# Patient Record
Sex: MALE | Employment: STUDENT | URBAN - METROPOLITAN AREA
[De-identification: names, ages, dates, MRNs, and addresses within clinical notes are randomized per-mention and may not be internally consistent; named-entity substitution may affect disease eponyms.]

---

## 2017-05-08 ENCOUNTER — TELEPHONE (OUTPATIENT)
Dept: FAMILY MEDICINE CLINIC | Facility: CLINIC | Age: 22
End: 2017-05-08

## 2017-05-08 NOTE — TELEPHONE ENCOUNTER
Mom called to schedule appointment with Dr David Fields for her son's knee pain and immunizations. I explained that Dr David Fields is out of the office until 5/15 and I would need to transfer her to the triage nurse.   Mom raised her voice that she didn't understand

## 2017-05-08 NOTE — TELEPHONE ENCOUNTER
Future Appointments  Date Time Provider Holly Giang   5/18/2017 10:30 AM Srikanth Bourgeois MD [facility] [address]       Spoke with mother and advised of situation  Son came home from college with knee pain and has had for awhile.  Will wait to see Dr Erin Zepeda

## 2017-05-18 ENCOUNTER — OFFICE VISIT (OUTPATIENT)
Dept: FAMILY MEDICINE CLINIC | Facility: CLINIC | Age: 22
End: 2017-05-18

## 2017-05-18 VITALS
OXYGEN SATURATION: 98 % | HEART RATE: 83 BPM | RESPIRATION RATE: 18 BRPM | TEMPERATURE: 99 F | BODY MASS INDEX: 26.65 KG/M2 | DIASTOLIC BLOOD PRESSURE: 64 MMHG | WEIGHT: 167.81 LBS | HEIGHT: 66.5 IN | SYSTOLIC BLOOD PRESSURE: 104 MMHG

## 2017-05-18 DIAGNOSIS — M25.561 CHRONIC PAIN OF RIGHT KNEE: Primary | ICD-10-CM

## 2017-05-18 DIAGNOSIS — G89.29 CHRONIC PAIN OF RIGHT KNEE: Primary | ICD-10-CM

## 2017-05-18 PROCEDURE — 99213 OFFICE O/P EST LOW 20 MIN: CPT | Performed by: FAMILY MEDICINE

## 2017-05-18 NOTE — PATIENT INSTRUCTIONS
Recommend low impact activities (walking, elliptical, bicycle, swimming), avoid jumping (basketball, tennis, running) when painful. If you try high impact activities in 2-3 weeks and have pain, call and we can refer to physical therapy or orthopedics.

## 2017-05-18 NOTE — PROGRESS NOTES
Louis Setting IS A 22year old male HERE FOR Patient presents with:  Knee Pain: right knee pain would like a referral for PT  Complete Form: complete immunization history form for school  -may need some imm/inj will be traveling to [facility] today at 5pm Maternal Grandfather      CVA   • Hypertension Maternal Grandmother    • Blood Disorder Other      blood clots, fam hx       Social history:         Social History   Marital Status: Single  Spouse Name: N/A    Years of Education: N/A  Number of Children: 0 If you try high impact activities in 2-3 weeks and have pain, call and we can refer to physical therapy or orthopedics. Recommend HPV vaccine, info given to read/review.

## 2017-11-19 ENCOUNTER — OFFICE VISIT (OUTPATIENT)
Dept: SLEEP CENTER | Facility: HOSPITAL | Age: 22
End: 2017-11-19
Attending: INTERNAL MEDICINE
Payer: COMMERCIAL

## 2017-11-19 PROCEDURE — 95810 POLYSOM 6/> YRS 4/> PARAM: CPT

## 2017-11-20 ENCOUNTER — OFFICE VISIT (OUTPATIENT)
Dept: SLEEP CENTER | Facility: HOSPITAL | Age: 22
End: 2017-11-20
Attending: INTERNAL MEDICINE
Payer: COMMERCIAL

## 2017-11-20 ENCOUNTER — LAB ENCOUNTER (OUTPATIENT)
Dept: LAB | Age: 22
End: 2017-11-20
Attending: INTERNAL MEDICINE
Payer: COMMERCIAL

## 2017-11-20 DIAGNOSIS — G47.00 INSOMNIA: Primary | ICD-10-CM

## 2017-11-20 PROCEDURE — 80307 DRUG TEST PRSMV CHEM ANLYZR: CPT

## 2017-11-20 PROCEDURE — 95805 MULTIPLE SLEEP LATENCY TEST: CPT

## 2017-11-23 NOTE — PROCEDURES
[address]       Accredited by the [facility] ([facility])    PATIENT'S NAME:        Ángel Likes PHYSICIAN:   Jeanette Stevens M.D. REFERRING PHYSICIAN:   PRICILLA Buchanan with a saturation of greater than 90%. PERIODIC LIMB MOVEMENTS:  PLM index is 0.5. EEG:  With the limited recording montage, no EEG abnormalities were detected. EKG: The EKG demonstrates sinus rhythm. IMPRESSION:  Normal baseline sleep study.

## 2017-12-20 PROBLEM — G47.419 PRIMARY NARCOLEPSY WITHOUT CATAPLEXY: Status: ACTIVE | Noted: 2017-12-20

## 2018-04-01 NOTE — PROCEDURES
[address] [place] [address]       Accredited by the [facility] ([facility])    PAT[facility]'S [facility]:        [facility] PHYSICIAN:   Cherrie Rivero M.D. REFERRING PHYSICIAN:   PRICILLA Abraham
No

## 2018-06-08 ENCOUNTER — OFFICE VISIT (OUTPATIENT)
Dept: FAMILY MEDICINE CLINIC | Facility: CLINIC | Age: 23
End: 2018-06-08

## 2018-06-08 VITALS
HEART RATE: 65 BPM | TEMPERATURE: 98 F | OXYGEN SATURATION: 98 % | BODY MASS INDEX: 27 KG/M2 | SYSTOLIC BLOOD PRESSURE: 112 MMHG | WEIGHT: 168 LBS | DIASTOLIC BLOOD PRESSURE: 78 MMHG | HEIGHT: 66 IN | RESPIRATION RATE: 15 BRPM

## 2018-06-08 DIAGNOSIS — Z23 NEED FOR TDAP VACCINATION: ICD-10-CM

## 2018-06-08 DIAGNOSIS — Z00.00 WELL ADULT EXAM: Primary | ICD-10-CM

## 2018-06-08 PROCEDURE — 90471 IMMUNIZATION ADMIN: CPT | Performed by: FAMILY MEDICINE

## 2018-06-08 PROCEDURE — 90715 TDAP VACCINE 7 YRS/> IM: CPT | Performed by: FAMILY MEDICINE

## 2018-06-08 PROCEDURE — 99395 PREV VISIT EST AGE 18-39: CPT | Performed by: FAMILY MEDICINE

## 2018-06-08 RX ORDER — QUETIAPINE 25 MG/1
25 TABLET, FILM COATED ORAL NIGHTLY
COMMUNITY
End: 2018-08-10

## 2018-06-08 NOTE — PROGRESS NOTES
Davie Campos is a 26year old male here for Patient presents with: Well Adult: Physical   .    HPI:    Patient complains of here for well exam.    Finished 2 semesters at [employer] On Ativan for panic, and seeing therapist weekly.  Plans to spend summer in  Allergy:  No Known Allergies    Family history:     Family History   Problem Relation Age of Onset   • Diabetes Paternal Grandfather    • Stroke Maternal Grandfather      CVA   • Hypertension Maternal Grandmother    • Blood Disorder Other      blood to allergies. GI: No change in appetite, no heartburn, diarrhea, constipation, or blood in stool. : No pain, frequency, urgency or incontinence with urination. Male:  No testicular or scrotal swelling or tenderness.  No nocturia or difficulty with uri Benzodiazepines Urine 11/20/2017 Negative  Negative Final   • Cocaine Urine 11/20/2017 Negative  Negative Final   • PCP Urine 11/20/2017 Negative  Negative Final   • Cannabinoid Urine 11/20/2017 Negative  Negative Final   • Opiate Urine 11/20/2017 Negative

## 2018-06-08 NOTE — PATIENT INSTRUCTIONS
Tdap recommended, due in March 2019, can give today. Gardasil (HPV) suggested, 3 dose series. Lifestyle: We encourage low fat diet with whole grains, fruits, vegetables, lean meats, fish and low-fat dairy. We encourage exercise 150 minutes per week.  Your B

## 2018-08-10 ENCOUNTER — OFFICE VISIT (OUTPATIENT)
Dept: FAMILY MEDICINE CLINIC | Facility: CLINIC | Age: 23
End: 2018-08-10
Payer: COMMERCIAL

## 2018-08-10 VITALS
OXYGEN SATURATION: 98 % | HEART RATE: 78 BPM | WEIGHT: 173.63 LBS | SYSTOLIC BLOOD PRESSURE: 122 MMHG | HEIGHT: 66 IN | BODY MASS INDEX: 27.91 KG/M2 | TEMPERATURE: 98 F | RESPIRATION RATE: 16 BRPM | DIASTOLIC BLOOD PRESSURE: 82 MMHG

## 2018-08-10 DIAGNOSIS — R53.82 CHRONIC FATIGUE: Primary | ICD-10-CM

## 2018-08-10 PROCEDURE — 99213 OFFICE O/P EST LOW 20 MIN: CPT | Performed by: FAMILY MEDICINE

## 2018-08-10 RX ORDER — ARIPIPRAZOLE 2 MG/1
4 TABLET ORAL EVERY EVENING
COMMUNITY
Start: 2018-08-08

## 2018-08-10 RX ORDER — LORAZEPAM 1 MG/1
1 TABLET ORAL EVERY 4 HOURS PRN
Refills: 0 | COMMUNITY
Start: 2018-08-10 | End: 2018-12-24

## 2018-08-10 NOTE — PROGRESS NOTES
Diony Lujan IS A 26year old male HERE FOR Patient presents with:  Sleep Problem: Pt is requesting lab work. Currently not seeing  Dr Dalton Lockhart        History of present illness:     C/o chronic fatigue, still hypersomnia.  Wants to rule out physical cause 15 mg by mouth daily. Disp:  Rfl:    Multiple Vitamins-Minerals (MULTIVITAMIN OR) Take 1 tablet by mouth daily. Disp:  Rfl:    Ergocalciferol (VITAMIN D OR) Take 5,000 Units by mouth daily.  Disp:  Rfl:    Omega-3 Fatty Acids (FISH OIL OR) Take 2 capsules b clear   Heart regular rhythm no S3 S4 murmur  Abdomen soft nontender  Extremities no edema. Test results: Sleep study results as summarized by Dr. Sheba Correa:   Camilo Nava was seen today for sleep problem.     Diagnoses and all orders

## 2018-08-11 ENCOUNTER — LAB ENCOUNTER (OUTPATIENT)
Dept: LAB | Facility: HOSPITAL | Age: 23
End: 2018-08-11
Attending: FAMILY MEDICINE
Payer: COMMERCIAL

## 2018-08-11 DIAGNOSIS — R53.82 CHRONIC FATIGUE: ICD-10-CM

## 2018-08-11 LAB
ALBUMIN SERPL-MCNC: 4.2 G/DL (ref 3.5–4.8)
ALBUMIN/GLOB SERPL: 1.3 {RATIO} (ref 1–2)
ALP LIVER SERPL-CCNC: 51 U/L (ref 45–117)
ALT SERPL-CCNC: 40 U/L (ref 17–63)
ANION GAP SERPL CALC-SCNC: 10 MMOL/L (ref 0–18)
AST SERPL-CCNC: 48 U/L (ref 15–41)
BASOPHILS # BLD AUTO: 0.02 X10(3) UL (ref 0–0.1)
BASOPHILS NFR BLD AUTO: 0.5 %
BILIRUB SERPL-MCNC: 1.6 MG/DL (ref 0.1–2)
BUN BLD-MCNC: 12 MG/DL (ref 8–20)
BUN/CREAT SERPL: 12.5 (ref 10–20)
CALCIUM BLD-MCNC: 8.9 MG/DL (ref 8.3–10.3)
CHLORIDE SERPL-SCNC: 102 MMOL/L (ref 101–111)
CO2 SERPL-SCNC: 27 MMOL/L (ref 22–32)
CREAT BLD-MCNC: 0.96 MG/DL (ref 0.7–1.3)
EOSINOPHIL # BLD AUTO: 0.11 X10(3) UL (ref 0–0.3)
EOSINOPHIL NFR BLD AUTO: 2.6 %
ERYTHROCYTE [DISTWIDTH] IN BLOOD BY AUTOMATED COUNT: 11.9 % (ref 11.5–16)
GLOBULIN PLAS-MCNC: 3.3 G/DL (ref 2.5–3.7)
GLUCOSE BLD-MCNC: 94 MG/DL (ref 70–99)
HCT VFR BLD AUTO: 43.7 % (ref 37–53)
HGB BLD-MCNC: 14.9 G/DL (ref 13–17)
IMMATURE GRANULOCYTE COUNT: 0 X10(3) UL (ref 0–1)
IMMATURE GRANULOCYTE RATIO %: 0 %
LYMPHOCYTES # BLD AUTO: 1.44 X10(3) UL (ref 0.9–4)
LYMPHOCYTES NFR BLD AUTO: 34.4 %
M PROTEIN MFR SERPL ELPH: 7.5 G/DL (ref 6.1–8.3)
MCH RBC QN AUTO: 29.9 PG (ref 27–33.2)
MCHC RBC AUTO-ENTMCNC: 34.1 G/DL (ref 31–37)
MCV RBC AUTO: 87.6 FL (ref 80–99)
MONOCYTES # BLD AUTO: 0.36 X10(3) UL (ref 0.1–1)
MONOCYTES NFR BLD AUTO: 8.6 %
NEUTROPHIL ABS PRELIM: 2.25 X10 (3) UL (ref 1.3–6.7)
NEUTROPHILS # BLD AUTO: 2.25 X10(3) UL (ref 1.3–6.7)
NEUTROPHILS NFR BLD AUTO: 53.9 %
OSMOLALITY SERPL CALC.SUM OF ELEC: 288 MOSM/KG (ref 275–295)
PLATELET # BLD AUTO: 257 10(3)UL (ref 150–450)
POTASSIUM SERPL-SCNC: 4 MMOL/L (ref 3.6–5.1)
RBC # BLD AUTO: 4.99 X10(6)UL (ref 4.3–5.7)
RED CELL DISTRIBUTION WIDTH-SD: 37.9 FL (ref 35.1–46.3)
SODIUM SERPL-SCNC: 139 MMOL/L (ref 136–144)
T4 FREE SERPL-MCNC: 1 NG/DL (ref 0.9–1.8)
TSI SER-ACNC: 0.85 MIU/ML (ref 0.35–5.5)
WBC # BLD AUTO: 4.2 X10(3) UL (ref 4–13)

## 2018-08-11 PROCEDURE — 85025 COMPLETE CBC W/AUTO DIFF WBC: CPT

## 2018-08-11 PROCEDURE — 84443 ASSAY THYROID STIM HORMONE: CPT

## 2018-08-11 PROCEDURE — 36415 COLL VENOUS BLD VENIPUNCTURE: CPT

## 2018-08-11 PROCEDURE — 80053 COMPREHEN METABOLIC PANEL: CPT

## 2018-08-11 PROCEDURE — 84439 ASSAY OF FREE THYROXINE: CPT

## 2018-12-24 ENCOUNTER — LAB ENCOUNTER (OUTPATIENT)
Dept: LAB | Age: 23
End: 2018-12-24
Attending: FAMILY MEDICINE
Payer: COMMERCIAL

## 2018-12-24 ENCOUNTER — OFFICE VISIT (OUTPATIENT)
Dept: FAMILY MEDICINE CLINIC | Facility: CLINIC | Age: 23
End: 2018-12-24
Payer: COMMERCIAL

## 2018-12-24 VITALS
WEIGHT: 176.88 LBS | HEART RATE: 63 BPM | SYSTOLIC BLOOD PRESSURE: 110 MMHG | TEMPERATURE: 98 F | DIASTOLIC BLOOD PRESSURE: 66 MMHG | HEIGHT: 66 IN | RESPIRATION RATE: 15 BRPM | BODY MASS INDEX: 28.43 KG/M2 | OXYGEN SATURATION: 97 %

## 2018-12-24 DIAGNOSIS — G47.419 PRIMARY NARCOLEPSY WITHOUT CATAPLEXY: ICD-10-CM

## 2018-12-24 DIAGNOSIS — R53.83 FATIGUE, UNSPECIFIED TYPE: ICD-10-CM

## 2018-12-24 DIAGNOSIS — G47.10 HYPERSOMNOLENCE: Primary | ICD-10-CM

## 2018-12-24 PROBLEM — F32.9 MAJOR DEPRESSIVE DISORDER WITH SINGLE EPISODE: Status: ACTIVE | Noted: 2018-12-24

## 2018-12-24 PROCEDURE — 36415 COLL VENOUS BLD VENIPUNCTURE: CPT

## 2018-12-24 PROCEDURE — 80053 COMPREHEN METABOLIC PANEL: CPT

## 2018-12-24 PROCEDURE — 83036 HEMOGLOBIN GLYCOSYLATED A1C: CPT

## 2018-12-24 PROCEDURE — 99214 OFFICE O/P EST MOD 30 MIN: CPT | Performed by: FAMILY MEDICINE

## 2018-12-24 PROCEDURE — 84443 ASSAY THYROID STIM HORMONE: CPT

## 2018-12-24 PROCEDURE — 85025 COMPLETE CBC W/AUTO DIFF WBC: CPT

## 2018-12-24 RX ORDER — BUSPIRONE HYDROCHLORIDE 10 MG/1
10 TABLET ORAL DAILY PRN
COMMUNITY

## 2018-12-24 NOTE — PATIENT INSTRUCTIONS
See Dr. Mouna Huntley or associate during break to discuss possibly starting medicine for narcolepsy and to control excess drowsiness. Modafinil or similar medicines usually used. Blood tests for anemia, thyroid and sugar liver & kidneys.     Encourage regul

## 2018-12-24 NOTE — PROGRESS NOTES
Robles Davidson IS A 26year old male HERE FOR Patient presents with:  Sleep Problem: Sleeping over 12 hours daily and still wakes up tired, Going on for a few weeks. History of present illness:     Sleeping 12 hours, still not feeling rested.  Mother h Current Outpatient Medications:  BusPIRone HCl 10 MG Oral Tab Take 10 mg by mouth daily as needed (panic attack). Disp:  Rfl:    ARIPiprazole 2 MG Oral Tab Take 4 mg by mouth every evening.    Disp:  Rfl:    FLUoxetine HCl (PROZAC OR) Take 40 mg by mo Blood Transfusions: Not Asked        Caffeine Concern: Yes          Coffee 2-3 cups daily         Occupational Exposure: No        Hobby Hazards: No        Sleep Concern: No        Stress Concern: Yes        Weight Concern: No        Special Diet: No could get as side effect of meds. Dr. Lena Jain needs to coordinate any narcolepsy meds with your psychiatrist or the psychiatrist could prescribe while under care.

## 2019-06-25 ENCOUNTER — MED REC SCAN ONLY (OUTPATIENT)
Dept: FAMILY MEDICINE CLINIC | Facility: CLINIC | Age: 24
End: 2019-06-25

## 2019-11-07 ENCOUNTER — OFFICE VISIT (OUTPATIENT)
Dept: FAMILY MEDICINE CLINIC | Facility: CLINIC | Age: 24
End: 2019-11-07
Payer: COMMERCIAL

## 2019-11-07 VITALS
DIASTOLIC BLOOD PRESSURE: 70 MMHG | HEIGHT: 66 IN | SYSTOLIC BLOOD PRESSURE: 112 MMHG | HEART RATE: 82 BPM | RESPIRATION RATE: 16 BRPM | TEMPERATURE: 98 F | WEIGHT: 173 LBS | BODY MASS INDEX: 27.8 KG/M2 | OXYGEN SATURATION: 98 %

## 2019-11-07 DIAGNOSIS — M25.561 RIGHT MEDIAL KNEE PAIN: Primary | ICD-10-CM

## 2019-11-07 PROCEDURE — 99213 OFFICE O/P EST LOW 20 MIN: CPT | Performed by: FAMILY MEDICINE

## 2019-11-07 NOTE — PATIENT INSTRUCTIONS
RIght knee MRI, at [facility] or in [facility]. Continue physical therapy. Brace may help for stability. Possible medial meniscus injury, [facility] to ask for outpatient appointment with ortho after MRI.

## 2019-11-07 NOTE — PROGRESS NOTES
Darci Tavera IS A 22year old male HERE FOR Patient presents with:  Musculoskeletal Problem: Inner Rt knee pain for a month. 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  8 hours or more standing 7 h • modafinil (PROVIGIL) 100 MG Oral Tab Take 1 tablet (100 mg total) by mouth daily. 30 tablet 5   • BusPIRone HCl 10 MG Oral Tab Take 10 mg by mouth daily as needed (panic attack). • ARIPiprazole 2 MG Oral Tab Take 4 mg by mouth every evening. Minutes per session: Not on file      Stress: Not on file    Relationships      Social connections:        Talks on phone: Not on file        Gets together: Not on file        Attends [employer]azdan[employer] service: Not on file        Active member of club or [employer]i city.     Continue physical therapy. Brace may help for stability. Possible medial meniscus injury, contact [facility] to ask for outpatient appointment with ortho after MRI.

## 2019-11-25 ENCOUNTER — TELEPHONE (OUTPATIENT)
Dept: FAMILY MEDICINE CLINIC | Facility: CLINIC | Age: 24
End: 2019-11-25

## 2019-12-28 ENCOUNTER — PATIENT MESSAGE (OUTPATIENT)
Dept: FAMILY MEDICINE CLINIC | Facility: CLINIC | Age: 24
End: 2019-12-28

## 2023-03-16 ENCOUNTER — TELEPHONE (OUTPATIENT)
Dept: FAMILY MEDICINE CLINIC | Facility: CLINIC | Age: 28
End: 2023-03-16

## 2023-03-21 ENCOUNTER — PATIENT OUTREACH (OUTPATIENT)
Dept: CASE MANAGEMENT | Age: 28
End: 2023-03-21

## 2023-03-21 NOTE — PROCEDURES
The office order for PCP removal request is Approved and finalized on March 21, 2023.     Thanks,  [facility] [employer]

## 2024-04-12 NOTE — TELEPHONE ENCOUNTER
Patient has forms for school, called to see if he needs appt or can just be filled out, needs vaccinations filled out and dr Kelley Argue. Will fax us form today or tomorrow for nurse to look at and determine if appt is needed or not.
(1) Not alert; but arousable by minor stimulation to obey, answer, or respond

## (undated) NOTE — MR AVS SNAPSHOT
[place] [address]  [phone]               Thank you for choosing us for your health care visit with Ronda Vogt MD.  We are glad to serve you and happy to provide you with this Call [phone] for help. [facility] is NOT to be used for urgent needs. For medical emergencies, dial 911.         Educational Information     Healthy Diet and Regular Exercise  [employer] for making healthy food choices  -